# Patient Record
(demographics unavailable — no encounter records)

---

## 2024-11-16 NOTE — HISTORY OF PRESENT ILLNESS
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 Patient denies palpable lumps, skin changes, or nipple discharge bilaterally.  Patient started on Anastrozole in 1/2024 managed by med onc, Dr. Duffy. Given the patients age and favorable risk disease, patient omitted radiation.  B/L DX MMG/US (8/26/24), BI-RADS 2.  Patient reports history of breast cyst excisions (Lx4, Rx2) 20-30 years ago.   Patient denies family history of breast cancer in sister age 94; ovarian cancer in twin sister age 42; pancreatic cancer in 3rd sister age 65. [employer] genetic testing (11/8/23) negative for pathogenic mutations. Patient has 3 daughters.  Patient reports history of asthma, emphysema not on home O2, HTN. Denies history of heart disease, DM, blood clots, sleep apnea, issues with anesthesia. Patient reports allergy to PCN, rxn: drop in blood pressure. Patient has been set up with [employer].   TNM stage: T1a, Nx, Mx AJCC Stage: 1A

## 2024-11-16 NOTE — ASSESSMENT
[FreeTextEntry1] : 75 year old patient presents for follow up evaluation s/p R localized lumpectomy on 11/30/23 of R IDC associated with calcifications % [place] 50% Her-2 neg, confirmed via stereotactic biopsy. 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 No XRT. Currently on Anastrozole (Dr. Duffy), tolerating well.  Patient without complaints. Physical exam WNL. Most recent imaging reviewed, B/L sMMG/US 8/26/24, BIRADS-2. Plan for B/L sMMG/US in August 2025 and return at that time for reexamination. Patient verbalizes understanding and is in agreement with the plan.

## 2024-11-16 NOTE — HISTORY OF PRESENT ILLNESS
[FreeTextEntry1] : 75 year old patient presents for follow up evaluation s/p R localized lumpectomy on 11/30/23 of R IDC associated with calcifications % [place] 50% Her-2 neg, confirmed via stereotactic biopsy. 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 Patient denies palpable lumps, skin changes, or nipple discharge bilaterally.  Patient started on Anastrozole in 1/2024 managed by med onc, Dr. Duffy. Given the patients age and favorable risk disease, patient omitted radiation.  B/L DX MMG/US (8/26/24), BI-RADS 2.  Patient reports history of breast cyst excisions (Lx4, Rx2) 20-30 years ago.   Patient denies family history of breast cancer in sister age 94; ovarian cancer in twin sister age 42; pancreatic cancer in 3rd sister age 65. [employer] genetic testing (11/8/23) negative for pathogenic mutations. Patient has 3 daughters.  Patient reports history of asthma, emphysema not on home O2, HTN. Denies history of heart disease, DM, blood clots, sleep apnea, issues with anesthesia. Patient reports allergy to PCN, rxn: drop in blood pressure. Patient has been set up with [facility].   TNM stage: T1a, Nx, Mx AJCC Stage: 1A

## 2024-11-16 NOTE — HISTORY OF PRESENT ILLNESS
[FreeTextEntry1] : 75 year old patient presents for follow up evaluation s/p R localized lumpectomy on 11/30/23 of R IDC associated with calcifications % [place] 50% Her-2 neg, confirmed via stereotactic biopsy. 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 Patient denies palpable lumps, skin changes, or nipple discharge bilaterally.  Patient started on Anastrozole in 1/2024 managed by med onc, Dr. Duffy. Given the patients age and favorable risk disease, patient omitted radiation.  B/L DX MMG/US (8/26/24), BI-RADS 2.  Patient reports history of breast cyst excisions (Lx4, Rx2) 20-30 years ago.   Patient denies family history of breast cancer in sister age 94; ovarian cancer in twin sister age 42; pancreatic cancer in 3rd sister age 65. [employer] genetic testing (11/8/23) negative for pathogenic mutations. Patient has 3 daughters.  Patient reports history of asthma, emphysema not on home O2, HTN. Denies history of heart disease, DM, blood clots, sleep apnea, issues with anesthesia. Patient reports allergy to PCN, rxn: drop in blood pressure. Patient has been set up with [email or web address].   TNM stage: T1a, Nx, Mx AJCC Stage: 1A

## 2024-11-16 NOTE — ASSESSMENT
[FreeTextEntry1] : 75 year old patient presents for follow up evaluation s/p R localized lumpectomy on 11/30/23 of R IDC associated with calcifications % [place] 50% Her-2 neg, confirmed via stereotactic biopsy. 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 No XRT. Currently on Anastrozole (Dr. Duffy), tolerating well.  Patient without complaints. Physical exam WNL. Most recent imaging reviewed, B/L sMMG/US 8/26/24, BIRADS-2. Plan for B/L sMMG/US in August 2025 and return at that time for reexamination. Patient verbalizes understanding and is in agreement with the plan.

## 2024-11-16 NOTE — DATA REVIEWED
[FreeTextEntry1] : 8/15/23 (R) B/L sMMG: heterogeneously dense. There is a subtle region of architectural distortion suspected in the upper outer quadrant of the right breast, 5 cm from the nipple. FOLLOW-UP: Diagnostic mammography and breast ultrasound are recommended. BI-RADS 0:  Incomplete.   10/16/23 (R) R DX MMG/US: heterogeneously dense. Subtle distortion in the right 9-10 o'clock axis, 5 cm from the nipple, persists with spot compression imaging. Indeterminant. FOLLOW-UP: Stereotactic biopsy. BI-RADS 4:  Suspicious.  10/27/23 ([facility] Path) Steretotactic biopsy of architectural distortion at the right 9-10 o'clock axis (top hat clip): invasive well differentiated ductal carcinoma, associated with calcifications, 2 mm in greatest dimension. Ductal carcinoma in situ, low-grade, cribriform type, associated necrosis. Benign calcifications. Malignant, concordant. Recommend definitive surgical and oncological management. Consider breast MRI for extent of disease. % [place] 50% Her-2 negative   10/30/23 ([facility] Surgical Path)  1. RIGHT BREAST 9:00, LUMPECTOMY: IDC well differentiated measuring 2.0 mm in greatest dimension, DCIS low nuclear grade cribriform type - DCIS is present in 3 of 25 slides and measures 12 mm in greatest dimension - All margins are negative for invasive carcinoma; the closest margin is anterior (1.5 mm), All margins are negative for DCIS; the closest margin is lateral (1.2 mm) 2. SUPERIOR/ INFERIOR/ MEDIAL/ LATERAL/ DEEP/ ANTERIOR MARGINS: Benign breast tissue  11/8/23 "[employer] genetic testing: negative for pathogenic mutations.  8/26/24 (R) B/l dxMMG/US- heterogeneously dense, Benign findings. No mammographic or ultrasonographic evidence of malignancy. BI-RADS 2

## 2024-11-16 NOTE — DATA REVIEWED
[FreeTextEntry1] : 8/15/23 (R) B/L sMMG: heterogeneously dense. There is a subtle region of architectural distortion suspected in the upper outer quadrant of the right breast, 5 cm from the nipple. FOLLOW-UP: Diagnostic mammography and breast ultrasound are recommended. BI-RADS 0:  Incomplete.   10/16/23 (R) R DX MMG/US: heterogeneously dense. Subtle distortion in the right 9-10 o'clock axis, 5 cm from the nipple, persists with spot compression imaging. Indeterminant. FOLLOW-UP: Stereotactic biopsy. BI-RADS 4:  Suspicious.  10/27/23 ([facility] Path) Steretotactic biopsy of architectural distortion at the right 9-10 o'clock axis (top hat clip): invasive well differentiated ductal carcinoma, associated with calcifications, 2 mm in greatest dimension. Ductal carcinoma in situ, low-grade, cribriform type, associated necrosis. Benign calcifications. Malignant, concordant. Recommend definitive surgical and oncological management. Consider breast MRI for extent of disease. % [place] 50% Her-2 negative   10/30/23 ([facility] Surgical Path)  1. RIGHT BREAST 9:00, LUMPECTOMY: IDC well differentiated measuring 2.0 mm in greatest dimension, DCIS low nuclear grade cribriform type - DCIS is present in 3 of 25 slides and measures 12 mm in greatest dimension - All margins are negative for invasive carcinoma; the closest margin is anterior (1.5 mm), All margins are negative for DCIS; the closest margin is lateral (1.2 mm) 2. SUPERIOR/ INFERIOR/ MEDIAL/ LATERAL/ DEEP/ ANTERIOR MARGINS: Benign breast tissue  11/8/23 [employer] genetic testing: negative for pathogenic mutations.  8/26/24 (R) B/l dxMMG/US- heterogeneously dense, Benign findings. No mammographic or ultrasonographic evidence of malignancy. BI-RADS 2

## 2024-11-16 NOTE — DATA REVIEWED
[FreeTextEntry1] : 8/15/23 (R) B/L sMMG: heterogeneously dense. There is a subtle region of architectural distortion suspected in the upper outer quadrant of the right breast, 5 cm from the nipple. FOLLOW-UP: Diagnostic mammography and breast ultrasound are recommended. BI-RADS 0:  Incomplete.   10/16/23 (R) R DX MMG/US: heterogeneously dense. Subtle distortion in the right 9-10 o'clock axis, 5 cm from the nipple, persists with spot compression imaging. Indeterminant. FOLLOW-UP: Stereotactic biopsy. BI-RADS 4:  Suspicious.  10/27/23 ([facility] Path) Steretotactic biopsy of architectural distortion at the right 9-10 o'clock axis (top hat clip): invasive well differentiated ductal carcinoma, associated with calcifications, 2 mm in greatest dimension. Ductal carcinoma in situ, low-grade, cribriform type, associated necrosis. Benign calcifications. Malignant, concordant. Recommend definitive surgical and oncological management. Consider breast MRI for extent of disease. % [place] 50% Her-2 negative   10/30/23 ([facility] Surgical Path)  1. RIGHT BREAST 9:00, LUMPECTOMY: IDC well differentiated measuring 2.0 mm in greatest dimension, DCIS low nuclear grade cribriform type - DCIS is present in 3 of 25 slides and measures 12 mm in greatest dimension - All margins are negative for invasive carcinoma; the closest margin is anterior (1.5 mm), All margins are negative for DCIS; the closest margin is lateral (1.2 mm) 2. SUPERIOR/ INFERIOR/ MEDIAL/ LATERAL/ DEEP/ ANTERIOR MARGINS: Benign breast tissue  11/8/23 [employer] genetic testing: negative for pathogenic mutations.  8/26/24 (R) B/l dxMMG/US- heterogeneously dense, Benign findings. No mammographic or ultrasonographic evidence of malignancy. BI-RADS 2

## 2024-11-16 NOTE — FAMILY HISTORY
[TextEntry] : Sister: breast cancer age 94 Sister: ovarian cancer age 42 Sister: pancreatic cancer 65

## 2024-11-16 NOTE — ASSESSMENT
[FreeTextEntry1] : 75 year old patient presents for follow up evaluation s/p R localized lumpectomy on 11/30/23 of R IDC associated with calcifications % [place] 50% Her-2 neg, confirmed via stereotactic biopsy. 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 No XRT. Currently on Anastrozole (Dr. Duffy), tolerating well.  Patient without complaints. Physical exam WNL. Most recent imaging reviewed, B/L sMMG/US 8/26/24, BIRADS-2. Plan for B/L sMMG/US in August 2025 and return at that time for reexamination. Patient verbalizes understanding and is in agreement with the plan.

## 2025-02-06 NOTE — REVIEW OF SYSTEMS
[Diarrhea: Grade 0] : Diarrhea: Grade 0 [Joint Pain] : joint pain [Negative] : Allergic/Immunologic [Dizziness] : dizziness

## 2025-02-06 NOTE — PHYSICAL EXAM
[Normal] : affect appropriate [de-identified] : surgical scar healing well; no mass palpated b/l

## 2025-02-06 NOTE — HISTORY OF PRESENT ILLNESS
"- See "s/p liver transplant."  - Monitor.    " [Disease: _____________________] : Disease: [unfilled] [T: ___] : T[unfilled] [N: ___] : N[unfilled] [M: ___] : M[unfilled] [AJCC Stage: ____] : AJCC Stage: [unfilled] [de-identified] : 75 year old female with right breast cancer s/p 11/30/23 right lumpectomy and adjuvant anastrozole since 1/2024. RT was omitted.  She is also on alendronate since 3/2024 for osteopenia.  Here for f/u.  Patient denies family history of breast cancer in sister age 94; ovarian cancer in twin sister age 42; pancreatic cancer in 3rd sister age 65.  Patient reports history of breast cyst excisions (Lx4, Rx2) 20-30 years ago.  5/24/21 DEXA: osteopenia of lumbar spine (T score -1.4), left femoral neck (T score -1.3).  3/16/23 XR R hip: 1.  Coxa profunda of both hips. Otherwise, unremarkable radiographs of the right hip and femur. 2.  Spondylosis of the visualized lower lumbar spine.  3.  2.1 cm somewhat crescentic calcification adjacent to the left ischial tuberosity unchanged from prior CT of 10/19/2020 and may represent calcific tendinitis versus sequela of remote injury.  3/16/23 XR R knee: Mild medial compartment joint space narrowing  8/15/23 ([facility]) B/L sMMG: heterogeneously dense. There is a subtle region of architectural distortion suspected in the upper outer quadrant of the right breast, 5 cm from the nipple. FOLLOW-UP: Diagnostic mammography and breast ultrasound are recommended. BI-RADS 0: Incomplete.  8/15/23 US abdomen: Patient is status post cholecystectomy, otherwise grossly unremarkable study.  8/15/23 MRI R hip: 1.  Intact osseous structures without stress fracture or stress reaction. 2.  Mild hip joint osteoarthritis with trace joint effusion. 3.  Gluteus medius and minimus insertional peritendinitis without tears. 4.  Trace trochanteric bursitis. 5.  Mild hamstring origin tendinosis without tears. 6.  Moderate pubic symphysis arthrosis.  10/16/23 ([facility]) R DX MMG/US: heterogeneously dense. Subtle distortion in the right 9-10 o'clock axis, 5 cm from the nipple, persists with spot compression imaging. Indeterminant. FOLLOW-UP: Stereotactic biopsy. BI-RADS 4: Suspicious.  10/27/23 ([facility] Path) Stereotactic biopsy of architectural distortion at the right 9-10 o'clock axis (top hat clip): invasive well differentiated ductal carcinoma, associated with calcifications, 2 mm in greatest dimension. Ductal carcinoma in situ, low-grade, cribriform type, associated necrosis. Benign calcifications. Malignant, concordant. Consider breast MRI for extent of disease. % [place] 50% Her-2 1+ negative  11/15/23 MRI breast: newly diagnosed right breast cancer (IDC, DCIS) upper outer quadrant.  No additional suspicious areas.  There is no axillary or internal mammary lymphadenopathy.  11/30/23 ([facility] Path) pT1a cN0 Mx 1. RIGHT BREAST 9:00, LUMPECTOMY: IDC well differentiated measuring 2.0 mm in greatest dimension, DCIS low nuclear grade cribriform type - DCIS is present in 3 of 25 slides and measures 12 mm in greatest dimension - All margins are negative for invasive carcinoma; the closest margin is anterior (1.5 mm), All margins are negative for DCIS; the closest margin is lateral (1.2 mm) 2. SUPERIOR/ INFERIOR/ MEDIAL/ LATERAL/ DEEP/ ANTERIOR MARGINS: Benign breast tissue.  1/26/24 R US:  No right axillary lymphadenopathy  2/1/24 DEXA: osteopenia of lumbar spine (T score -.2.0) and left femoral neck (T score -1.1). Left total hip (T score -0.8)  8/26/24 DIGITAL BILATERAL DIAGNOSTIC MAMMOGRAM AND TOMOSYNTHESIS AND BREAST ULTRASOUND Benign findings. No mammographic or ultrasonographic evidence of malignancy.  [de-identified] : well differentiated invasive ductal carcinoma [de-identified] : % [place] 50% Her-2 1+ negative [de-identified] : [employer] genetic testing (11/8/23) negative for pathogenic mutations. [de-identified] : She is tolerating anastrozole well.  She reports prominent veins that is painful in the right forefoot x 3 months.  She has an appointment with PCP on the 13th and also a podiatrist. She reports occasional dizziness.  Her PCP prescribed meclizine, takes 1 at night which helps.  She reports brief LOC Had sneezing, cough, congestion which she thinks is related to allergies.  Feels better the past 2 days.  Has f/u with pulm coming up.   Denies new breast masses.  Reports itch and pain when she scratches on the right upper breast.

## 2025-02-06 NOTE — HISTORY OF PRESENT ILLNESS
[Disease: _____________________] : Disease: [unfilled] [T: ___] : T[unfilled] [N: ___] : N[unfilled] [M: ___] : M[unfilled] [AJCC Stage: ____] : AJCC Stage: [unfilled] [de-identified] : 75 year old female with right breast cancer s/p 11/30/23 right lumpectomy and adjuvant anastrozole since 1/2024. RT was omitted.  She is also on alendronate since 3/2024 for osteopenia.  Here for f/u.  Patient denies family history of breast cancer in sister age 94; ovarian cancer in twin sister age 42; pancreatic cancer in 3rd sister age 65.  Patient reports history of breast cyst excisions (Lx4, Rx2) 20-30 years ago.  5/24/21 DEXA: osteopenia of lumbar spine (T score -1.4), left femoral neck (T score -1.3).  3/16/23 XR R hip: 1.  Coxa profunda of both hips. Otherwise, unremarkable radiographs of the right hip and femur. 2.  Spondylosis of the visualized lower lumbar spine.  3.  2.1 cm somewhat crescentic calcification adjacent to the left ischial tuberosity unchanged from prior CT of 10/19/2020 and may represent calcific tendinitis versus sequela of remote injury.  3/16/23 XR R knee: Mild medial compartment joint space narrowing  8/15/23 ([facility]) B/L sMMG: heterogeneously dense. There is a subtle region of architectural distortion suspected in the upper outer quadrant of the right breast, 5 cm from the nipple. FOLLOW-UP: Diagnostic mammography and breast ultrasound are recommended. BI-RADS 0: Incomplete.  8/15/23 US abdomen: Patient is status post cholecystectomy, otherwise grossly unremarkable study.  8/15/23 MRI R hip: 1.  Intact osseous structures without stress fracture or stress reaction. 2.  Mild hip joint osteoarthritis with trace joint effusion. 3.  Gluteus medius and minimus insertional peritendinitis without tears. 4.  Trace trochanteric bursitis. 5.  Mild hamstring origin tendinosis without tears. 6.  Moderate pubic symphysis arthrosis.  10/16/23 ([facility]) R DX MMG/US: heterogeneously dense. Subtle distortion in the right 9-10 o'clock axis, 5 cm from the nipple, persists with spot compression imaging. Indeterminant. FOLLOW-UP: Stereotactic biopsy. BI-RADS 4: Suspicious.  10/27/23 ([facility] Path) Stereotactic biopsy of architectural distortion at the right 9-10 o'clock axis (top hat clip): invasive well differentiated ductal carcinoma, associated with calcifications, 2 mm in greatest dimension. Ductal carcinoma in situ, low-grade, cribriform type, associated necrosis. Benign calcifications. Malignant, concordant. Consider breast MRI for extent of disease. % [place] 50% Her-2 1+ negative  11/15/23 MRI breast: newly diagnosed right breast cancer (IDC, DCIS) upper outer quadrant.  No additional suspicious areas.  There is no axillary or internal mammary lymphadenopathy.  11/30/23 ([facility] Path) pT1a cN0 Mx 1. RIGHT BREAST 9:00, LUMPECTOMY: IDC well differentiated measuring 2.0 mm in greatest dimension, DCIS low nuclear grade cribriform type - DCIS is present in 3 of 25 slides and measures 12 mm in greatest dimension - All margins are negative for invasive carcinoma; the closest margin is anterior (1.5 mm), All margins are negative for DCIS; the closest margin is lateral (1.2 mm) 2. SUPERIOR/ INFERIOR/ MEDIAL/ LATERAL/ DEEP/ ANTERIOR MARGINS: Benign breast tissue.  1/26/24 R US:  No right axillary lymphadenopathy  2/1/24 DEXA: osteopenia of lumbar spine (T score -.2.0) and left femoral neck (T score -1.1). Left total hip (T score -0.8)  8/26/24 DIGITAL BILATERAL DIAGNOSTIC MAMMOGRAM AND TOMOSYNTHESIS AND BREAST ULTRASOUND Benign findings. No mammographic or ultrasonographic evidence of malignancy.  [de-identified] : well differentiated invasive ductal carcinoma [de-identified] : % [place] 50% Her-2 1+ negative [de-identified] : [employer] genetic testing (11/8/23) negative for pathogenic mutations. [de-identified] : She is tolerating anastrozole well.  She reports prominent veins that is painful in the right forefoot x 3 months.  She has an appointment with PCP on the 13th and also a podiatrist. She reports occasional dizziness.  Her PCP prescribed meclizine, takes 1 at night which helps.  She reports brief LOC Had sneezing, cough, congestion which she thinks is related to allergies.  Feels better the past 2 days.  Has f/u with pulm coming up.   Denies new breast masses.  Reports itch and pain when she scratches on the right upper breast.

## 2025-02-06 NOTE — PHYSICAL EXAM
[Normal] : affect appropriate [de-identified] : surgical scar healing well; no mass palpated b/l